# Patient Record
(demographics unavailable — no encounter records)

---

## 2025-05-02 NOTE — PLAN
[FreeTextEntry1] : #HCM -Breast self exam reviewed and taught -STI Screening today declined -Pap/HPV with today -Rx for mammogram and breast sonogram given for 11/2025 -Advised pt to schedule colonoscopy- due 2025 -Immunizations: UTD  #Perimenopause Sxs:  -Discussed perimenopause sxs in detail  -pt reports shorter cycles q21d, but denies AUB -Pt to monitor sxs   #QUENTIN:  -Pelvic floor PT Rx given -Advised to limit caffeine and carbonated drink intake, timed voids -Offered urogynecology recommendations- pt declines at this time. will start with PFPT  #sister w/ h/o DCIS @40 and mayo sarcoma -pt To send prior genetic testing results to us. pt reports that she is neg -Discussed further genetic testing options, pt to consider  #Possible FamHx of fallopian tube ca in MGM, pt with h/o ovarian cyst -Pelvic sonogram to be done for baseline   RTO in 1 year for annual or PRN for any GYN complaints GIOVANA Samson MD

## 2025-05-02 NOTE — COUNSELING
[Nutrition/ Exercise/ Weight Management] : nutrition, exercise, weight management [Vitamins/Supplements] : vitamins/supplements [Breast Self Exam] : breast self exam [Confidentiality] : confidentiality [STD (testing, results, tx)] : STD (testing, results, tx)

## 2025-05-02 NOTE — PHYSICAL EXAM
[Chaperoned Physical Exam] : A chaperone was present in the examining room during all aspects of the physical examination. [Appropriately responsive] : appropriately responsive [Alert] : alert [No Acute Distress] : no acute distress [No Lymphadenopathy] : no lymphadenopathy [Regular Rate Rhythm] : regular rate rhythm [No Murmurs] : no murmurs [Clear to Auscultation B/L] : clear to auscultation bilaterally [Soft] : soft [Non-tender] : non-tender [Non-distended] : non-distended [No HSM] : No HSM [No Lesions] : no lesions [No Mass] : no mass [Oriented x3] : oriented x3 [Examination Of The Breasts] : a normal appearance [No Masses] : no breast masses were palpable [Labia Majora] : normal [Labia Minora] : normal [Normal] : normal [Uterine Adnexae] : normal [Declined] : Patient declined rectal exam [FreeTextEntry2] :  Raine cornelius) [FreeTextEntry6] : dense breasts

## 2025-05-02 NOTE — HISTORY OF PRESENT ILLNESS
[Patient reported PAP Smear was normal] : Patient reported PAP Smear was normal [Patient reported colonoscopy was normal] : Patient reported colonoscopy was normal [No] : Patient does not have concerns regarding sex [Currently Active] : currently active [Men] : men [FreeTextEntry1] : 2025. ESTRELLA PITTS 45 year old female  LMP 2025 presents for an annual gyn exam and to establish care.   She has monthly menses q21 days, not too heavy or painful. She notes one incidence of an 11 day menses cycle. She denies night sweats, hot flashes, or mood changes.   No vaginal discharge or vaginitis symptoms. She denies abdominal or pelvic pain. She reports mild stress incontinence. BM is normal per patient.   She states that she was genetically tested in , and was BRCA negative.   Her maternal grandmother had uterine cancer and fallopian tube cancer. Pt is unsure if the uterine cancer metastasized to fallopian tubes or if fallopian tube cancer was separate.   She follows with GI MD for history of GERD.   She is sexually active with men, denies dysfunction.    OBHx:  -G1- , GDM in pregnancy -G2- 2012  GynHx: PCOS, tubal ligation, h/o ovarian cyst PMH: GERD, gastritis, GI ulcer, anemia, B12 deficiency, BRCA negative ().  SHx: cholecystectomy, cataract surgery, tubal ligation, carpal tunnel surgery Meds: Atorvastatin, Pantoprazole, Baclofen All: Penicillin (anaphylaxis), Amoxicillin (anaphylaxis), Compazine (anaphylaxis)  Soc: Social alcohol. No drug, or tobacco use, non-smoker. Psych: negative FHx: Sister- DCIS (dx at 41 y/o) and Solo sarcoma (dx at 15 y/o). Denies FHx of breast, ovarian, uterine or colon cancer. MGM- uterine ca, fallopian tube ca PHQ9=1 [Mammogramdate] : 11/2024 [TextBox_19] : BIRADS 2 [BreastSonogramDate] : 11/2024 [TextBox_25] : BIRADS 2 [PapSmeardate] : 2024 [ColonoscopyDate] : 2023 [TextBox_43] : due 2025

## 2025-05-02 NOTE — SIGNATURES
[TextEntry] : I, Raine Zarate, am scribing for and in the presence of RANJIT Markham M.D. in the following sections: HISTORY OF PRESENT ILLNESS, PAST MEDICAL/FAMILY/SOCIAL HISTORY, REVIEW OF SYSTEMS, PHYSICAL EXAM, ASSESSMENT/PLAN.  All medical record entries made by the Scribe were at my,  RANJIT Markham M.D., direction and personally dictated by me on 04/10/2025. I have personally reviewed the chart and agree that the record reflects my personal performance of the history, physical exam, assessment, and plan.